# Patient Record
Sex: MALE | Race: WHITE | NOT HISPANIC OR LATINO
[De-identification: names, ages, dates, MRNs, and addresses within clinical notes are randomized per-mention and may not be internally consistent; named-entity substitution may affect disease eponyms.]

---

## 2020-01-13 ENCOUNTER — TRANSCRIPTION ENCOUNTER (OUTPATIENT)
Age: 30
End: 2020-01-13

## 2020-04-26 ENCOUNTER — MESSAGE (OUTPATIENT)
Age: 30
End: 2020-04-26

## 2020-05-02 ENCOUNTER — APPOINTMENT (OUTPATIENT)
Dept: DISASTER EMERGENCY | Facility: CLINIC | Age: 30
End: 2020-05-02

## 2020-05-04 ENCOUNTER — TRANSCRIPTION ENCOUNTER (OUTPATIENT)
Age: 30
End: 2020-05-04

## 2020-05-04 LAB
SARS-COV-2 IGG SERPL IA-ACNC: 0.8 RATIO
SARS-COV-2 IGG SERPL QL IA: NEGATIVE

## 2020-08-06 ENCOUNTER — RESULT CHARGE (OUTPATIENT)
Age: 30
End: 2020-08-06

## 2020-08-10 ENCOUNTER — APPOINTMENT (OUTPATIENT)
Dept: PEDIATRIC CARDIOLOGY | Facility: CLINIC | Age: 30
End: 2020-08-10
Payer: COMMERCIAL

## 2020-08-10 VITALS
OXYGEN SATURATION: 99 % | WEIGHT: 161.82 LBS | HEART RATE: 64 BPM | HEIGHT: 74 IN | DIASTOLIC BLOOD PRESSURE: 75 MMHG | BODY MASS INDEX: 20.77 KG/M2 | SYSTOLIC BLOOD PRESSURE: 121 MMHG

## 2020-08-10 DIAGNOSIS — Z95.4 PRESENCE OF OTHER HEART-VALVE REPLACEMENT: ICD-10-CM

## 2020-08-10 DIAGNOSIS — Z83.42 FAMILY HISTORY OF FAMILIAL HYPERCHOLESTEROLEMIA: ICD-10-CM

## 2020-08-10 DIAGNOSIS — Z82.49 FAMILY HISTORY OF ISCHEMIC HEART DISEASE AND OTHER DISEASES OF THE CIRCULATORY SYSTEM: ICD-10-CM

## 2020-08-10 DIAGNOSIS — Z29.8 ENCOUNTER FOR OTHER SPECIFIED PROPHYLACTIC MEASURES: ICD-10-CM

## 2020-08-10 PROCEDURE — 93325 DOPPLER ECHO COLOR FLOW MAPG: CPT

## 2020-08-10 PROCEDURE — 93303 ECHO TRANSTHORACIC: CPT

## 2020-08-10 PROCEDURE — 93000 ELECTROCARDIOGRAM COMPLETE: CPT

## 2020-08-10 PROCEDURE — 93320 DOPPLER ECHO COMPLETE: CPT

## 2020-08-10 PROCEDURE — 99205 OFFICE O/P NEW HI 60 MIN: CPT | Mod: 25

## 2020-08-10 RX ORDER — AMOXICILLIN 500 MG/1
500 TABLET, FILM COATED ORAL
Qty: 4 | Refills: 3 | Status: ACTIVE | COMMUNITY
Start: 2020-08-10

## 2020-08-12 NOTE — REASON FOR VISIT
[Initial Consultation] : an initial consultation for [Bicuspid Aortic Valve] : bicuspid aortic valve [Aortic Stenosis] : aortic stenosis [Mitral Valve Prolapse] : mitral valve prolapse [Patient] : patient [FreeTextEntry3] : s/p Ross procedure 2015 / s/p balloon angioplasty 1995

## 2020-08-12 NOTE — PHYSICAL EXAM
[General Appearance - Well Nourished] : well nourished [General Appearance - Alert] : alert [General Appearance - In No Acute Distress] : in no acute distress [General Appearance - Well-Appearing] : well appearing [General Appearance - Well Developed] : well developed [Facies] : the head and face were normal in appearance [Appearance Of Head] : the head was normocephalic [Sclera] : the conjunctiva were normal [Respiration, Rhythm And Depth] : normal respiratory rhythm and effort [No Cough] : no cough [Auscultation Breath Sounds / Voice Sounds] : breath sounds clear to auscultation bilaterally [Sternotomy] : sternotomy [Well-Healed] : well-healed [Heart Sounds] : normal S1 and S2 [Heart Rate And Rhythm] : normal heart rate and rhythm [Apical Impulse] : quiet precordium with normal apical impulse [Heart Sounds Gallop] : no gallops [Heart Sounds Pericardial Friction Rub] : no pericardial rub [Heart Sounds Click] : no clicks [Capillary Refill Test] : normal capillary refill [Edema] : no edema [Arterial Pulses] : normal upper and lower extremity pulses with no pulse delay [LUSB] : LUSB [I] : a grade 1/6  [Systolic] : systolic [Ejection] : ejection [Nondistended] : nondistended [Abdomen Soft] : soft [Abdomen Tenderness] : non-tender [Skin Color & Pigmentation] : normal skin color and pigmentation [Nail Clubbing] : no clubbing  or cyanosis of the fingernails [] : no hepato-splenomegaly [Mood] : mood and affect were appropriate for age [Demonstrated Behavior - Infant Nonreactive To Parents] : interactive [FreeTextEntry1] : wearing a mask

## 2020-08-12 NOTE — CONSULT LETTER
[Today's Date] : [unfilled] [Name] : Name: [unfilled] [] : : ~~ [Today's Date:] : [unfilled] [Consult] : I had the pleasure of evaluating your patient, [unfilled]. My full evaluation follows. [Consult - Single Provider] : Thank you very much for allowing me to participate in the care of this patient. If you have any questions, please do not hesitate to contact me. [Sincerely,] : Sincerely, [FreeTextEntry4] : N/A [de-identified] : Trever Luu MD\par Pediatric Cardiology\par Adult Congenital Heart Disease\par  of Pediatrics\par [employer] [facility]

## 2020-08-12 NOTE — REVIEW OF SYSTEMS
[Wgt Loss (___ Lbs)] : no recent weight loss [Change in Vision] : no change in vision [Loss Of Hearing] : no hearing loss [Cyanosis] : no cyanosis [Edema] : no edema [Diaphoresis] : not diaphoretic [Chest Pain] : no chest pain or discomfort [Exercise Intolerance] : no persistence of exercise intolerance [Palpitations] : no palpitations [Orthopnea] : no orthopnea [Fast HR] : no tachycardia [Tachypnea] : not tachypneic [Shortness Of Breath] : not expressed as feeling short of breath [Decrease In Appetite] : appetite not decreased [Vomiting] : no vomiting [Headache] : no headache [Fainting (Syncope)] : no fainting [Dizziness] : no dizziness [Joint Swelling] : no joint swelling [Failure To Thrive] : no failure to thrive [Dec Urine Output] : no oliguria

## 2020-08-12 NOTE — DISCUSSION/SUMMARY
[Needs SBE Prophylaxis] : [unfilled]  needs bacterial endocarditis prophylaxis. SBE prophylaxis is indicated for dental and invasive ENT procedures. (Circulation. 2007; 116: 6329-3042) [FreeTextEntry1] : In summary, Jim is a 30 year old male born with critical aortic stenosis due to a bicuspid aortic valve who underwent balloon valvoplasty as a . He ultimately underwent a Ross procedure in 2015 for progressive aortic insufficiency with associated left ventricular dilation and perhaps dysfunction.  I reviewed the anatomy and physiology at length with Jim, who has a great understanding of his own defect and repair.  The RV to PA conduit has minimal stenosis and no significant insufficiency.  The neoaortic root measures 3.96, which is mildly dilated according to Z scores for a native aortic root, and there is trivial insufficiency.  One note I have to review states he had a Ross-Konno procedure, which would make sense given a probable size discrepancy between the native aorta and main pulmonary artery. I will request the operative report to get a better understanding of his root size at the time of surgery.  He has excellent exercise tolerance by report and no history of arrhythmia.  \par \par SBE prophylaxis is necessary given that he has pulmonary homograft.  I reviewed that Aspirin 81 mg once daily has a theoretical role in preserving homograft function and risk for endocarditis. \par \par Bicuspid aortic valve screening is indicated for all first degree family members. In the future, I would recommend a formal fetal echocardiogram during each pregnancy at 18 to 22 weeks and a probable one time assessment in the post  period. \par \par 1.  A screening Holter monitor was placed today. I will contact him when the results become available.\par 2.  I will request his operative report from [facility] ([facility]par 3.  Cardiac MRI to evaluate the neoaorta for aneurysm formation, quantification of RV volumes/function, evaluate for LGE as it results to coronary reimplantation\par 4.  Eventually, can obtain baseline exercise stress testing. Currently, CPET not being done due to COVID. If he has any symptoms concerning for ischemia, I will obtain a nuclear stress sooner.\par 5.  I ordered baseline labs- hasn't had primary care in years. I encouraged getting plugged in to a primary care physician.\par 6.  SBE prophylaxis is necessary given RV to PA homograft\par 7.  Start Aspirin 81 mg once daily.  Will let me know if any symptoms related to it- GI upset, bleeding\par \par Follow up in one year, sooner if any concerns arise.

## 2020-08-12 NOTE — CARDIOLOGY SUMMARY
[Today's Date] : [unfilled] [FreeTextEntry2] : 1. History of bicuspid aortic valve and aortic stenosis.\par 2. Status post balloon aortic valvuloplasty and status post Ross procedure, including autotransplantation\par of native pulmonary valve to aortic position, placement of right ventricle to pulmonary artery conduit and\par coronary artery re-implantation.\par 3. Status post placement of a valved right ventricle to pulmonary artery conduit.\par 4. Normal right ventricular morphology with qualitatively normal size and systolic function.\par 5. Acceleration of flow across the conduit < 2m/sec.\par 6. Normal left ventricular size, morphology and systolic function.\par 7. Trivial neoaortic regurgitation.\par 8. Mildly dilated aortic root.\par 9. No pericardial effusion.\par \par Ao root sinus, s: 3.96cm = Z score +3.37 [FreeTextEntry1] : normal sinus rhythm with sinus arrhythmia\par normal axis, voltages, and intervals

## 2020-08-12 NOTE — HISTORY OF PRESENT ILLNESS
[FreeTextEntry1] : I had the pleasure of seeing Jim Hung in the [facility] of [facility] on August 10, 2020 for an initial consultation.  As you know, Jim is a 30 year old male who was born with aortic stenosis related to a bicuspid aortic valve.  He underwent balloon angioplasty as a toddler- he thinks in 1995. Over time, he developed progressive aortic insufficiency.  On May 4, 2015, he underwent a Ross procedure with placement of a 30 mm pulmonary homograft by Dr. Quinton Hook at [facility] (now Replaced by [facility]).  I have two follow up notes to review from a month post operative and another from 2018.  In 2018, he was evaluated by Dr. Maharaj in [place].  His major complaint was intermittent palpitations.  He received an event monitor but never knew the results- he assumed everything was okay because he was not contacted.  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 He exercises regularly, running three to four times per week and strength training two to three times per week.  He denies any symptoms at all.  Specifically, he denies chest pain, palpitations, shortness of breath, dyspnea on exertion, PND, orthopnea, near syncope, or syncope.\par \par He is on no medications.\par \par He sees a dentist regularly. He is unsure if he requires SBE prophylaxis. In January, he did not take SBE prophylaxis prior to seeing the dentist.

## 2021-04-30 ENCOUNTER — NON-APPOINTMENT (OUTPATIENT)
Age: 31
End: 2021-04-30

## 2021-05-03 ENCOUNTER — APPOINTMENT (OUTPATIENT)
Dept: INTERNAL MEDICINE | Facility: CLINIC | Age: 31
End: 2021-05-03
Payer: COMMERCIAL

## 2021-05-03 VITALS
HEART RATE: 89 BPM | BODY MASS INDEX: 21.05 KG/M2 | HEIGHT: 74 IN | OXYGEN SATURATION: 98 % | SYSTOLIC BLOOD PRESSURE: 120 MMHG | WEIGHT: 164 LBS | DIASTOLIC BLOOD PRESSURE: 70 MMHG

## 2021-05-03 DIAGNOSIS — Q23.0 CONGENITAL STENOSIS OF AORTIC VALVE: ICD-10-CM

## 2021-05-03 DIAGNOSIS — Q23.1 CONGENITAL STENOSIS OF AORTIC VALVE: ICD-10-CM

## 2021-05-03 DIAGNOSIS — Z00.00 ENCOUNTER FOR GENERAL ADULT MEDICAL EXAMINATION W/OUT ABNORMAL FINDINGS: ICD-10-CM

## 2021-05-03 DIAGNOSIS — Z78.9 OTHER SPECIFIED HEALTH STATUS: ICD-10-CM

## 2021-05-03 DIAGNOSIS — Q23.1 CONGENITAL INSUFFICIENCY OF AORTIC VALVE: ICD-10-CM

## 2021-05-03 PROCEDURE — 99072 ADDL SUPL MATRL&STAF TM PHE: CPT

## 2021-05-03 PROCEDURE — 99385 PREV VISIT NEW AGE 18-39: CPT

## 2021-05-03 PROCEDURE — G0442 ANNUAL ALCOHOL SCREEN 15 MIN: CPT

## 2021-05-03 PROCEDURE — G0444 DEPRESSION SCREEN ANNUAL: CPT

## 2021-05-03 NOTE — REVIEW OF SYSTEMS
[Mole Changes] : mole changes [Fever] : no fever [Chills] : no chills [Chest Pain] : no chest pain [Shortness Of Breath] : no shortness of breath [Abdominal Pain] : no abdominal pain [Suicidal] : not suicidal [Negative] : Heme/Lymph

## 2021-05-03 NOTE — ASSESSMENT
[FreeTextEntry1] : 31M with congenital heart disease s/p multiple surgeries had followed with Dr. Luu presents for visit to establish care with new PMD. \par \par 1. HCM\par -HIV screening offered today\par -flu vaccine up to date\par -tdap up to date; will check with EHS if needed\par -COVID vaccine\par \par 2. Congential heart disease\par -needs bacterial endocarditis ppx for dental and invasive ent procedures\par -c/w asa 81 mg daily\par -patient was to complete cardiac MRI plans to f/u with cardiologist \par -f/u with Dr. Luu office\par

## 2021-05-03 NOTE — HISTORY OF PRESENT ILLNESS
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 C/b progressive aortic insufficiency, followed by Ross procedure with placement of a 30 mm pulmonary homograft by Dr. Quinton Hook at [facility] in 2015)\par \par He saw Dr. Luu in 2020. \par SBE PROPHYLAXIS IS NECESSARY GIVEN HOMOGRAFT. Patient also advised to start aspirin\par

## 2021-05-03 NOTE — HEALTH RISK ASSESSMENT
[With Significant Other] : lives with significant other [Employed] : employed [Good] : ~his/her~  mood as  good [Yes] : Yes [2 - 3 times a week (3 pts)] : 2 - 3  times a week (3 points) [1 or 2 (0 pts)] : 1 or 2 (0 points) [Never (0 pts)] : Never (0 points) [No] : In the past 12 months have you used drugs other than those required for medical reasons? No [0] : 1) Little interest or pleasure doing things: Not at all (0) [1] : 2) Feeling down, depressed, or hopeless for several days (1) [HIV Test offered] : HIV Test offered [Hepatitis C test offered] : Hepatitis C test offered [None] : None [] :  [Sexually Active] : sexually active [Feels Safe at Home] : Feels safe at home [Fully functional (bathing, dressing, toileting, transferring, walking, feeding)] : Fully functional (bathing, dressing, toileting, transferring, walking, feeding) [Fully functional (using the telephone, shopping, preparing meals, housekeeping, doing laundry, using] : Fully functional and needs no help or supervision to perform IADLs (using the telephone, shopping, preparing meals, housekeeping, doing laundry, using transportation, managing medications and managing finances) [Smoke Detector] : smoke detector [Carbon Monoxide Detector] : carbon monoxide detector [Seat Belt] :  uses seat belt [Sunscreen] : uses sunscreen [] : No [Audit-CScore] : 3 [de-identified] : going to exercise more now doing 1-2x per week [de-identified] : diet is healthy- vegetarian [FreeTextEntry1] : feels he is not depressed but rather worried about cardiology match, has worked really hard and coping with stresses of residency as well. not feeling like he needs medication or therapy now but is interested in follow up information if he wants to in the future.  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

## 2021-05-03 NOTE — PHYSICAL EXAM
[Normal Voice/Communication] : normal voice/communication [No Varicosities] : no varicosities [No Edema] : there was no peripheral edema [Soft] : abdomen soft [Non Tender] : non-tender [Non-distended] : non-distended [No Masses] : no abdominal mass palpated [No HSM] : no HSM [Normal Supraclavicular Nodes] : no supraclavicular lymphadenopathy [Coordination Grossly Intact] : coordination grossly intact [No Focal Deficits] : no focal deficits [Normal Gait] : normal gait [Normal] : affect was normal and insight and judgment were intact [de-identified] : +systolic murmur [de-identified] : freckles on shoulders/arms

## 2021-05-04 ENCOUNTER — TRANSCRIPTION ENCOUNTER (OUTPATIENT)
Age: 31
End: 2021-05-04

## 2021-05-04 LAB
ALBUMIN SERPL ELPH-MCNC: 4.9 G/DL
ALP BLD-CCNC: 71 U/L
ALT SERPL-CCNC: 27 U/L
ANION GAP SERPL CALC-SCNC: 12 MMOL/L
AST SERPL-CCNC: 20 U/L
BASOPHILS # BLD AUTO: 0.05 K/UL
BASOPHILS NFR BLD AUTO: 0.8 %
BILIRUB SERPL-MCNC: 0.6 MG/DL
BUN SERPL-MCNC: 11 MG/DL
CALCIUM SERPL-MCNC: 9.8 MG/DL
CHLORIDE SERPL-SCNC: 102 MMOL/L
CHOLEST SERPL-MCNC: 227 MG/DL
CO2 SERPL-SCNC: 27 MMOL/L
COVID-19 NUCLEOCAPSID  GAM ANTIBODY INTERPRETATION: NEGATIVE
COVID-19 SPIKE DOMAIN ANTIBODY INTERPRETATION: POSITIVE
CREAT SERPL-MCNC: 0.9 MG/DL
EOSINOPHIL # BLD AUTO: 0.07 K/UL
EOSINOPHIL NFR BLD AUTO: 1.1 %
ESTIMATED AVERAGE GLUCOSE: 94 MG/DL
GLUCOSE SERPL-MCNC: 97 MG/DL
HBA1C MFR BLD HPLC: 4.9 %
HCT VFR BLD CALC: 47.2 %
HCV AB SER QL: NONREACTIVE
HCV S/CO RATIO: 0.14 S/CO
HDLC SERPL-MCNC: 52 MG/DL
HGB BLD-MCNC: 16 G/DL
HIV1+2 AB SPEC QL IA.RAPID: NONREACTIVE
IMM GRANULOCYTES NFR BLD AUTO: 0.3 %
LDLC SERPL CALC-MCNC: 135 MG/DL
LYMPHOCYTES # BLD AUTO: 1.96 K/UL
LYMPHOCYTES NFR BLD AUTO: 30 %
MAN DIFF?: NORMAL
MCHC RBC-ENTMCNC: 30.9 PG
MCHC RBC-ENTMCNC: 33.9 GM/DL
MCV RBC AUTO: 91.1 FL
MONOCYTES # BLD AUTO: 0.47 K/UL
MONOCYTES NFR BLD AUTO: 7.2 %
NEUTROPHILS # BLD AUTO: 3.96 K/UL
NEUTROPHILS NFR BLD AUTO: 60.6 %
NONHDLC SERPL-MCNC: 175 MG/DL
PLATELET # BLD AUTO: 207 K/UL
POTASSIUM SERPL-SCNC: 4 MMOL/L
PROT SERPL-MCNC: 7.4 G/DL
RBC # BLD: 5.18 M/UL
RBC # FLD: 12.3 %
SARS-COV-2 AB SERPL IA-ACNC: >250 U/ML
SARS-COV-2 AB SERPL QL IA: 0.09 INDEX
SODIUM SERPL-SCNC: 141 MMOL/L
TRIGL SERPL-MCNC: 198 MG/DL
WBC # FLD AUTO: 6.53 K/UL

## 2021-06-23 ENCOUNTER — APPOINTMENT (OUTPATIENT)
Dept: PSYCHIATRY | Facility: CLINIC | Age: 31
End: 2021-06-23
Payer: COMMERCIAL

## 2021-06-23 PROCEDURE — 99072 ADDL SUPL MATRL&STAF TM PHE: CPT

## 2021-06-23 PROCEDURE — 99205 OFFICE O/P NEW HI 60 MIN: CPT

## 2021-06-23 RX ORDER — SERTRALINE 25 MG/1
25 TABLET, FILM COATED ORAL
Qty: 3 | Refills: 0 | Status: ACTIVE | COMMUNITY
Start: 2021-06-23 | End: 1900-01-01

## 2021-07-20 ENCOUNTER — APPOINTMENT (OUTPATIENT)
Dept: PSYCHIATRY | Facility: CLINIC | Age: 31
End: 2021-07-20
Payer: COMMERCIAL

## 2021-07-20 PROCEDURE — 99214 OFFICE O/P EST MOD 30 MIN: CPT

## 2021-07-20 PROCEDURE — 99072 ADDL SUPL MATRL&STAF TM PHE: CPT

## 2021-08-11 ENCOUNTER — RESULT REVIEW (OUTPATIENT)
Age: 31
End: 2021-08-11

## 2021-08-11 ENCOUNTER — APPOINTMENT (OUTPATIENT)
Dept: MRI IMAGING | Facility: HOSPITAL | Age: 31
End: 2021-08-11
Payer: COMMERCIAL

## 2021-08-11 ENCOUNTER — OUTPATIENT (OUTPATIENT)
Dept: OUTPATIENT SERVICES | Age: 31
LOS: 1 days | End: 2021-08-11

## 2021-08-11 DIAGNOSIS — Q23.0 CONGENITAL STENOSIS OF AORTIC VALVE: ICD-10-CM

## 2021-08-11 DIAGNOSIS — Z95.4 PRESENCE OF OTHER HEART-VALVE REPLACEMENT: ICD-10-CM

## 2021-08-11 DIAGNOSIS — Q23.1 CONGENITAL INSUFFICIENCY OF AORTIC VALVE: ICD-10-CM

## 2021-08-11 PROCEDURE — 75561 CARDIAC MRI FOR MORPH W/DYE: CPT | Mod: 26

## 2021-08-11 PROCEDURE — 75565 CARD MRI VELOC FLOW MAPPING: CPT | Mod: 26

## 2021-10-26 ENCOUNTER — APPOINTMENT (OUTPATIENT)
Dept: PSYCHIATRY | Facility: CLINIC | Age: 31
End: 2021-10-26
Payer: COMMERCIAL

## 2021-10-26 ENCOUNTER — APPOINTMENT (OUTPATIENT)
Dept: DERMATOLOGY | Facility: CLINIC | Age: 31
End: 2021-10-26
Payer: COMMERCIAL

## 2021-10-26 VITALS — BODY MASS INDEX: 21.17 KG/M2 | HEIGHT: 74 IN | WEIGHT: 165 LBS

## 2021-10-26 DIAGNOSIS — Z12.83 ENCOUNTER FOR SCREENING FOR MALIGNANT NEOPLASM OF SKIN: ICD-10-CM

## 2021-10-26 DIAGNOSIS — L82.1 OTHER SEBORRHEIC KERATOSIS: ICD-10-CM

## 2021-10-26 DIAGNOSIS — F41.9 ANXIETY DISORDER, UNSPECIFIED: ICD-10-CM

## 2021-10-26 DIAGNOSIS — L65.9 NONSCARRING HAIR LOSS, UNSPECIFIED: ICD-10-CM

## 2021-10-26 DIAGNOSIS — L30.9 DERMATITIS, UNSPECIFIED: ICD-10-CM

## 2021-10-26 PROCEDURE — 99204 OFFICE O/P NEW MOD 45 MIN: CPT

## 2021-10-26 PROCEDURE — 99214 OFFICE O/P EST MOD 30 MIN: CPT

## 2021-10-26 RX ORDER — TRIAMCINOLONE ACETONIDE 1 MG/G
0.1 OINTMENT TOPICAL
Qty: 1 | Refills: 0 | Status: ACTIVE | COMMUNITY
Start: 2021-10-26 | End: 1900-01-01

## 2021-10-26 RX ORDER — BENZOYL PEROXIDE 5 G/100G
5 LIQUID TOPICAL
Qty: 1 | Refills: 11 | Status: ACTIVE | COMMUNITY
Start: 2021-10-26 | End: 1900-01-01

## 2021-10-26 RX ORDER — SERTRALINE HYDROCHLORIDE 100 MG/1
100 TABLET, FILM COATED ORAL
Qty: 90 | Refills: 0 | Status: ACTIVE | COMMUNITY
Start: 2021-06-23 | End: 1900-01-01

## 2021-10-27 PROBLEM — F41.9 ANXIETY: Status: ACTIVE | Noted: 2021-06-23

## 2022-01-25 ENCOUNTER — APPOINTMENT (OUTPATIENT)
Dept: PSYCHIATRY | Facility: CLINIC | Age: 32
End: 2022-01-25

## 2022-03-14 ENCOUNTER — RESULT CHARGE (OUTPATIENT)
Age: 32
End: 2022-03-14

## 2022-03-21 ENCOUNTER — APPOINTMENT (OUTPATIENT)
Dept: PEDIATRIC CARDIOLOGY | Facility: CLINIC | Age: 32
End: 2022-03-21
Payer: COMMERCIAL

## 2022-03-21 VITALS
HEART RATE: 86 BPM | OXYGEN SATURATION: 98 % | HEIGHT: 74.02 IN | SYSTOLIC BLOOD PRESSURE: 119 MMHG | DIASTOLIC BLOOD PRESSURE: 78 MMHG | BODY MASS INDEX: 22.3 KG/M2 | WEIGHT: 173.72 LBS

## 2022-03-21 PROCEDURE — 93303 ECHO TRANSTHORACIC: CPT

## 2022-03-21 PROCEDURE — 93000 ELECTROCARDIOGRAM COMPLETE: CPT

## 2022-03-21 PROCEDURE — 93325 DOPPLER ECHO COLOR FLOW MAPG: CPT

## 2022-03-21 PROCEDURE — 93320 DOPPLER ECHO COMPLETE: CPT

## 2022-03-21 PROCEDURE — 99214 OFFICE O/P EST MOD 30 MIN: CPT

## 2022-03-21 RX ORDER — ASPIRIN 81 MG/1
81 TABLET, CHEWABLE ORAL
Refills: 0 | Status: DISCONTINUED | COMMUNITY
Start: 2020-08-10 | End: 2022-03-21

## 2022-03-21 NOTE — PHYSICAL EXAM
[General Appearance - Alert] : alert [Facies] : the head and face were normal in appearance [Sclera] : the sclera were normal [No Cough] : no cough [Sternotomy] : sternotomy [Well-Healed] : well-healed [Apical Impulse] : quiet precordium with normal apical impulse [Heart Rate And Rhythm] : normal heart rate and rhythm [Heart Sounds] : normal S1 and S2 [Systolic] : systolic [I] : a grade 1/6  [LUSB] : LUSB [Abdomen Soft] : soft [] : no hepato-splenomegaly [Nail Clubbing] : no clubbing  or cyanosis of the fingers [Motor Tone] : normal muscle strength and tone [Skin Color & Pigmentation] : normal skin color and pigmentation [Demonstrated Behavior - Infant Nonreactive To Parents] : interactive

## 2022-03-22 NOTE — DISCUSSION/SUMMARY
[Needs SBE Prophylaxis] : [unfilled]  needs bacterial endocarditis prophylaxis. SBE prophylaxis is indicated for dental and invasive ENT procedures. (Circulation. 2007; 116: 5165-3389) [FreeTextEntry1] : In summary, Jim is a 31 year old male born with critical aortic stenosis due to a bicuspid aortic valve who underwent balloon valvuloplasty as a . He ultimately underwent a Ross procedure in 2015 for progressive aortic insufficiency with associated left ventricular dilation and perhaps dysfunction. The RV to PA conduit has minimal stenosis and no significant insufficiency which is unchanged from his echo done last year. His neoaortic root measurement is essentially unchanged as well. He has excellent exercise tolerance by report and no history of arrhythmia. \par \par SBE prophylaxis is necessary given that he has pulmonary homograft. I reviewed that Aspirin 81 mg once daily has a theoretical role in preserving homograft function and risk for endocarditis. Bicuspid aortic valve screening is indicated for all first degree family members. \par \par As he will be relocating to [place] we have recommended that he have copies of his most recent imaging, last clinic visit, ECG and operative report available to make for a smooth transition to an A[facility] program at [employer]. It will be important for him to always have access to his records as he relocates to continue his training.\par \par He should be seen in one year and would recommend an exercise stress test at that time\par  [PE + No Restrictions] : [unfilled] may participate in the entire physical education program without restriction, including all varsity competitive sports. [Influenza vaccine is recommended] : Influenza vaccine is recommended

## 2022-03-22 NOTE — HISTORY OF PRESENT ILLNESS
[FreeTextEntry1] : We had the pleasure of seeing Jim Hung in the Adult [facility] of [facility] on March 21, 2022. Jim is a 31 year old male who was born with aortic stenosis related to a bicuspid aortic valve. He underwent balloon angioplasty as a toddler- he thinks in 1995. Over time, he developed progressive aortic insufficiency. On May 4, 2015, he underwent a Ross procedure with placement of a 30 mm pulmonary homograft by Dr. 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 He has excellent exercise tolerance, and does cardio and light weights 1-3 times per week. He has no reports of chest pain, palpitations, shortness of breath, dyspnea on exertion, PND, orthopnea, near syncope, or syncope.\par \par He is on no medications.\par \par He sees a dentist regularly and takes appropriate SBE prophylaxis\par

## 2022-03-22 NOTE — REASON FOR VISIT
[Aortic Stenosis] : aortic stenosis [Bicuspid Aortic Valve] : bicuspid aortic valve [Patient] : patient [Follow-Up] : a follow-up visit for [FreeTextEntry1] : Aortic Stenosis, s/p Ross Operation

## 2022-03-22 NOTE — CONSULT LETTER
[Today's Date] : [unfilled] [] : : ~~ [Name] : Name: [unfilled] [Today's Date:] : [unfilled] [Dear  ___:] : Dear Dr. [unfilled]: [Consult] : I had the pleasure of evaluating your patient, [unfilled]. My full evaluation follows. [Consult - Single Provider] : Thank you very much for allowing me to participate in the care of this patient. If you have any questions, please do not hesitate to contact me. [Sincerely,] : Sincerely, [FreeTextEntry4] : Chitra Daniel MD [FreeTextEntry5] : [address], [address] [FreeTextEntry6] : [place] [address]

## 2022-03-22 NOTE — CARDIOLOGY SUMMARY
[Today's Date] : [unfilled] [FreeTextEntry1] : NSR, ventricular rate 74 bpm [FreeTextEntry2] : See attached echo report